# Patient Record
Sex: MALE | HISPANIC OR LATINO | Employment: OTHER | RURAL
[De-identification: names, ages, dates, MRNs, and addresses within clinical notes are randomized per-mention and may not be internally consistent; named-entity substitution may affect disease eponyms.]

---

## 2021-08-03 ENCOUNTER — TRANSCRIBE ORDERS (OUTPATIENT)
Dept: PHYSICAL THERAPY | Facility: CLINIC | Age: 61
End: 2021-08-03

## 2021-08-03 DIAGNOSIS — M25.511 RIGHT SHOULDER PAIN, UNSPECIFIED CHRONICITY: ICD-10-CM

## 2021-08-03 DIAGNOSIS — M25.50 PAIN IN JOINT, MULTIPLE SITES: Primary | ICD-10-CM

## 2021-08-10 ENCOUNTER — TREATMENT (OUTPATIENT)
Dept: PHYSICAL THERAPY | Facility: CLINIC | Age: 61
End: 2021-08-10

## 2021-08-10 DIAGNOSIS — M25.511 RIGHT SHOULDER PAIN, UNSPECIFIED CHRONICITY: Primary | ICD-10-CM

## 2021-08-10 PROCEDURE — 97110 THERAPEUTIC EXERCISES: CPT | Performed by: PHYSICAL THERAPIST

## 2021-08-10 PROCEDURE — 97162 PT EVAL MOD COMPLEX 30 MIN: CPT | Performed by: PHYSICAL THERAPIST

## 2021-08-10 NOTE — PROGRESS NOTES
"  Physical Therapy Initial Evaluation and Plan of Care      Patient: Ronda Clarke   : 1960  Diagnosis/ICD-10 Code:  Right shoulder pain, unspecified chronicity [M25.511]  Referring practitioner: Jennifer Capone NP  Date of Initial Visit: 8/10/2021  Today's Date: 8/10/2021  Patient seen for 1 sessions    Recertification: 2021   Next MD appt: 2021.    Cancer history, No estim/US         Subjective Questionnaire: QuickDASH: 45.45%      Subjective Evaluation    History of Present Illness  Onset date: ~6 months.  Mechanism of injury: Patient reports he bent over to lift something up and he felt something shift in the shoulder. No pop.       Patient Occupation: Retired from [employer]- special duty [employer] Quality of life: fair    Pain  Current pain ratin  At best pain ratin  At worst pain ratin  Location: R shoulder  Quality: pressure and sharp  Aggravating factors: sleeping, prolonged positioning, lifting and movement  Progression: no change    Hand dominance: right    Diagnostic Tests  X-ray: abnormal (\"arthritis\")    Treatments  No previous or current treatments  Patient Goals  Patient goals for therapy: decreased pain, independence with ADLs/IADLs, increased strength and increased motion             Objective          Static Posture     Head  Forward.    Shoulders  Depressed and rounded.    Scapulae  Left winging and right winging.    Postural Observations  Seated posture: poor  Standing posture: fair        Tenderness     Right Shoulder  Tenderness in the AC joint, acromion, biceps tendon (proximal) and bicipital groove.     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    Active Range of Motion   Left Shoulder   Flexion: 170 degrees   Abduction: 170 degrees   External " rotation 90°: 78 degrees   Internal rotation 90°: 68 degrees     Right Shoulder   Flexion: 122 degrees   Abduction: 82 degrees with pain  External rotation 45°: 70 degrees   Internal rotation 45°: 32 degrees with pain    Passive Range of Motion     Right Shoulder   Normal passive range of motion    Strength/Myotome Testing     Left Shoulder     Planes of Motion   Flexion: 4+   Abduction: 5   External rotation at 0°: 4+   Internal rotation at 0°: 5     Additional Strength Details  MMT for R UE deferred due ot pain in R UE.    Tests   Cervical     Left   Negative active compression (Merrick).     Right   Negative active compression (Merrick).     Left Shoulder   Negative anterior load and shift, AC shear, apprehension, belly press, clunk, crossover, drop arm, empty can, full can, Hawkin's, laxity (step off), lift-off, Neer's, painful arc, passive horizontal adduction, Speed's, SC joint stress, sulcus sign, relocation and Yergason's.     Right Shoulder   Positive crossover, Hawkin's, Neer's and sulcus sign.   Negative anterior load and shift, AC shear, apprehension, clunk, drop arm, laxity (step off), lift-off, SC joint stress and relocation.           Assessment & Plan     Assessment  Impairments: abnormal or restricted ROM, activity intolerance, impaired physical strength, lacks appropriate home exercise program and pain with function  Assessment details: Patient arrived 10 min late today. Patient has limitations in AROM and strength with s/s of impingement. Does have significant B scapular weakness noted in B shoulders. Also has significant arthritic changes in B knees and is pending B knee replacement. Patient did well with all HEP exercises today. Patient was given written copies of HEP.  Prognosis: fair  Prognosis details: Barriers to Rehab: Include significant or possible arthritic/degenerative changes that have occurred within the joint, The chronicity of this issue.    Safety Issues: Cancer history, No  estim/US    Functional Limitations: carrying objects, lifting, sleeping, pulling, pushing, uncomfortable because of pain, reaching behind back, reaching overhead and unable to perform repetitive tasks  Goals  Plan Goals: Short Term Goals:  1) I with HEP and have additions/changes by next re-certification.    2) Patient to be more aware of posture and posture correction technique.    3) AROM R Shoulder flexion >=145°.    4) AROM R Shoulder abduction >=125°.    5) AROM R shoulder IR >= 45° at 90° of shoulder abduction.    6) AROM R shoulder ER >= 75° at 90° of shoulder abduction.    7) R Shoulder  MMT >= 4/5, in available range    Long Term Goals:  1) Patient to have full AAROM with 3-way pulley's.    2) AROM R Shoulder flexion/abduction >=160°.    3) AROM R shoulder IR >= 60° at 90° of shoulder abduction.    4) AROM R shoulder ER >= 75° at 90° of shoulder abduction.    5) B UE 5/5.    6) Patient able to perform 1 arm box carry with 10# for 3 minutes with no increase in pain.    7) Patient able to perform 3 minutes of OH activities with no increase in pain.    8) I with final HEP    Plan  Therapy options: will be seen for skilled physical therapy services  Planned modality interventions: cryotherapy  Planned therapy interventions: flexibility, functional ROM exercises, home exercise program, manual therapy, neuromuscular re-education, postural training, soft tissue mobilization, joint mobilization, strengthening, stretching and therapeutic activities  Duration in visits: 12  Treatment plan discussed with: patient  Plan details: Progress overall ROM, strength, scap stab, posture, function, and activity tolerance.      Other therapeutic activities and/or exercises will be prescribed depending on the patients progress or lack there of.     Visit Diagnoses:    ICD-10-CM ICD-9-CM   1. Right shoulder pain, unspecified chronicity  M25.511 719.41       Timed:  Manual Therapy:         mins  95952;  Therapeutic Exercise:    12      mins  65032;      Neuromuscular Aditi:        mins  69619;    Therapeutic Activity:          mins  10293;     Gait Training:           mins  77750;     Ultrasound:          mins  15200;    Paraffin:        mins  74864;  Electrical Stimulation:         mins  94795 ( );    Untimed:  Electrical Stimulation:         mins  10686 ( );  Mechanical Traction:         mins  53008;     Timed Treatment:   12   mins   Total Treatment:     37   mins    PT SIGNATURE: Rita Shore PT DPT, CSCS   DATE TREATMENT INITIATED: 8/10/2021    Initial Certification  Certification Period: 11/8/2021  I certify that the therapy services are furnished while this patient is under my care.  The services outlined above are required by this patient, and will be reviewed every 90 days.     PHYSICIAN: Jennifer Capone NP      DATE:     Please sign and return via fax to  .. Thank you, [facility].        This document has been electronically signed by Rita Shore PT DPT, CSCS on August 10, 2021 08:32 CDT

## 2021-08-17 ENCOUNTER — TELEPHONE (OUTPATIENT)
Dept: PHYSICAL THERAPY | Facility: CLINIC | Age: 61
End: 2021-08-17

## 2021-08-17 NOTE — TELEPHONE ENCOUNTER
Pt initially arrived at wrong appointment time. Left and did not return for scheduled appointment time.  Called regarding missed PT appointment, no answer, busy signal.  No message left.

## 2021-08-19 ENCOUNTER — TREATMENT (OUTPATIENT)
Dept: PHYSICAL THERAPY | Facility: CLINIC | Age: 61
End: 2021-08-19

## 2021-08-19 DIAGNOSIS — M25.511 RIGHT SHOULDER PAIN, UNSPECIFIED CHRONICITY: Primary | ICD-10-CM

## 2021-08-19 PROCEDURE — 97110 THERAPEUTIC EXERCISES: CPT | Performed by: PHYSICAL THERAPIST

## 2021-08-19 NOTE — PROGRESS NOTES
Physical Therapy Daily Progress Note        Patient: Ronda Clarke   : 1960  Diagnosis/ICD-10 Code:  Right shoulder pain, unspecified chronicity [M25.511]  Referring practitioner: Jennifer Capone NP  Date of Initial Visit: Type: THERAPY  Noted: 8/10/2021  Today's Date: 2021  Patient seen for 2 sessions    Recertification: 2021   Next MD appt: 2021.     Cancer history, No estim/US           Subjective Evaluation    History of Present Illness    Subjective comment: Pt currently c/o 5/10 pain and verbalizes compliance with his HEP.       Objective   See Exercise, Manual, and Modality Logs for complete treatment.       Assessment & Plan     Assessment  Assessment details: Pt required several cues to improve posture and for correct technique with ex's especially due to his tendency to be impulsive.    Goals  Plan Goals: Plan Goals: Short Term Goals:  1) I with HEP and have additions/changes by next re-certification.    2) Patient to be more aware of posture and posture correction technique.    3) AROM R Shoulder flexion >=145°.    4) AROM R Shoulder abduction >=125°.    5) AROM R shoulder IR >= 45° at 90° of shoulder abduction.    6) AROM R shoulder ER >= 75° at 90° of shoulder abduction.    7) R Shoulder  MMT >= 4/5, in available range    Long Term Goals:  1) Patient to have full AAROM with 3-way pulley's.    2) AROM R Shoulder flexion/abduction >=160°.    3) AROM R shoulder IR >= 60° at 90° of shoulder abduction.    4) AROM R shoulder ER >= 75° at 90° of shoulder abduction.    5) B UE 5/5.    6) Patient able to perform 1 arm box carry with 10# for 3 minutes with no increase in pain.    7) Patient able to perform 3 minutes of OH activities with no increase in pain.    8) I with final HEP    Plan  Plan details: May add wall wipes and/or circles next visit.                   Manual Therapy:         mins  09329;  Therapeutic Exercise:    48     mins  40630;     Neuromuscular Aditi:        mins   16619;    Therapeutic Activity:          mins  95806;     Gait Training:           mins  54165;     Ultrasound:          mins  94229;    Electrical Stimulation:         mins  77213 ( );  Dry Needling          mins self-pay    Timed Treatment:   48   mins   Total Treatment:     48   mins    Malina Flor PTA  Physical Therapist Assistant

## 2021-08-24 ENCOUNTER — TREATMENT (OUTPATIENT)
Dept: PHYSICAL THERAPY | Facility: CLINIC | Age: 61
End: 2021-08-24

## 2021-08-24 DIAGNOSIS — M25.511 RIGHT SHOULDER PAIN, UNSPECIFIED CHRONICITY: Primary | ICD-10-CM

## 2021-08-24 PROCEDURE — 97110 THERAPEUTIC EXERCISES: CPT | Performed by: PHYSICAL THERAPIST

## 2021-08-24 NOTE — PROGRESS NOTES
"   Physical Therapy Daily Treatment Note      Patient: Ronda Clarke   : 1960  Referring practitioner: Jennifer Capone NP  Date of Initial Visit: Type: THERAPY  Noted: 8/10/2021  Today's Date: 2021  Patient seen for 3 sessions    Recertification: 2021   Next MD appt: 2021.     Cancer history, No estim/US       Ronda Clarke reports:         Subjective Evaluation    History of Present Illness    Subjective comment: pt states that his shoulder is sore. No pain, shoulder might be a little better.Pain  Pain scale: \"sore\"           Objective          Active Range of Motion     Right Shoulder   Flexion: Right shoulder active forward flexion: supine AA wand flexion 155.       See Exercise, Manual, and Modality Logs for complete treatment.       Assessment & Plan     Assessment  Assessment details: Pt requires mod/max cues for form with treatment. Pt did fair with standing AA activities. Required increase cues for upright posture    Goals  Plan Goals: Short Term Goals:  1) I with HEP and have additions/changes by next re-certification.    2) Patient to be more aware of posture and posture correction technique.    3) AROM R Shoulder flexion >=145°.    4) AROM R Shoulder abduction >=125°.    5) AROM R shoulder IR >= 45° at 90° of shoulder abduction.    6) AROM R shoulder ER >= 75° at 90° of shoulder abduction.    7) R Shoulder  MMT >= 4/5, in available range    Long Term Goals:  1) Patient to have full AAROM with 3-way pulley's.    2) AROM R Shoulder flexion/abduction >=160°.    3) AROM R shoulder IR >= 60° at 90° of shoulder abduction.    4) AROM R shoulder ER >= 75° at 90° of shoulder abduction.    5) B UE 5/5.    6) Patient able to perform 1 arm box carry with 10# for 3 minutes with no increase in pain.    7) Patient able to perform 3 minutes of OH activities with no increase in pain.    8) I with final HEP      Plan  Plan details: Try standing Tband shoulder IR/ER gentle next visit. Try prone " ITYs.        Visit Diagnoses:    ICD-10-CM ICD-9-CM   1. Right shoulder pain, unspecified chronicity  M25.511 719.41       Progress per Plan of Care and Progress strengthening /stabilization /functional activity           Timed:  Manual Therapy:         mins  51682;  Therapeutic Exercise:    45     mins  23829;     Neuromuscular Aditi:        mins  13902;    Therapeutic Activity:          mins  64854;     Gait Training:           mins  96265;     Ultrasound:          mins  54184;    Electrical Stimulation:         mins  06224 ( );    Untimed:  Electrical Stimulation:         mins  15597 ( );  Mechanical Traction:         mins  61535;     Timed Treatment:   45   mins   Total Treatment:     45   mins  Marcelle Reed [facility]  Physical Therapist

## 2021-08-26 ENCOUNTER — TREATMENT (OUTPATIENT)
Dept: PHYSICAL THERAPY | Facility: CLINIC | Age: 61
End: 2021-08-26

## 2021-08-26 DIAGNOSIS — M25.511 RIGHT SHOULDER PAIN, UNSPECIFIED CHRONICITY: Primary | ICD-10-CM

## 2021-08-26 PROCEDURE — 97110 THERAPEUTIC EXERCISES: CPT | Performed by: PHYSICAL THERAPIST

## 2021-08-26 NOTE — PROGRESS NOTES
Physical Therapy Daily Progress Note    Patient: Ronda Clarke   : 1960  Diagnosis/ICD-10 Code:     Diagnosis Plan   1. Right shoulder pain, unspecified chronicity       Referring practitioner: Jennifer Capone NP  Date of Initial Visit: Type: THERAPY  Noted: 8/10/2021  Today's Date: 2021  Patient seen for 4 sessions      PT Recheck Due: 2021  PT MD Visit: 2021    Cancer history, No estim/US       Ronda Clarke reports: 60% improvement        Subjective Evaluation    History of Present Illness    Subjective comment: Pt states his shoulder is doing better.  He is happy that after 6 months of hurting him          Objective   See Exercise, Manual, and Modality Logs for complete treatment.       Assessment & Plan     Assessment  Assessment details: Pt with good effort with all therex.  Occasional cues for slowing movements for improved quality.  Verbal cues for posture throughout tx session.  Reports occasional popping in R shoulder with aching with mid rows.  No increased pain reports with addition of wall wipes.      Goals  Plan Goals: Short Term Goals:  1) I with HEP and have additions/changes by next re-certification.    2) Patient to be more aware of posture and posture correction technique.    3) AROM R Shoulder flexion >=145°.    4) AROM R Shoulder abduction >=125°.    5) AROM R shoulder IR >= 45° at 90° of shoulder abduction.    6) AROM R shoulder ER >= 75° at 90° of shoulder abduction.    7) R Shoulder  MMT >= 4/5, in available range    Long Term Goals:  1) Patient to have full AAROM with 3-way pulley's.    2) AROM R Shoulder flexion/abduction >=160°.    3) AROM R shoulder IR >= 60° at 90° of shoulder abduction.    4) AROM R shoulder ER >= 75° at 90° of shoulder abduction.    5) B UE 5/5.    6) Patient able to perform 1 arm box carry with 10# for 3 minutes with no increase in pain.    7) Patient able to perform 3 minutes of OH activities with no increase in pain.    8) I with final  HEP    Plan  Plan details: Next visit add ITY's either prone or over pball as tolerated.       Progress per Plan of Care and Progress strengthening /stabilization /functional activity            Timed:  Manual Therapy:         mins  38685;  Therapeutic Exercise:    40     mins  71879;   Aquatic Therex :        mins  41588    Neuromuscular Aditi:        mins  03099;    Therapeutic Activity:          mins  51489;     Gait Training:           mins  06811;     Ultrasound:          mins  25009;    Electrical Stimulation:         mins  38093 ( );    Untimed:  Electrical Stimulation:         mins  10760 ( );  Mechanical Traction:         mins  93452;   Orthotic fit/train:         mins  26699    Timed Treatment:   40   mins   Total Treatment:     40   mins  Kadie Dhillon PTA  Physical Therapist Assistant        This document has been electronically signed by Kadie Dhillon PTA on August 26, 2021 09:30 CDT

## 2021-08-30 ENCOUNTER — TREATMENT (OUTPATIENT)
Dept: PHYSICAL THERAPY | Facility: CLINIC | Age: 61
End: 2021-08-30

## 2021-08-30 DIAGNOSIS — M25.511 RIGHT SHOULDER PAIN, UNSPECIFIED CHRONICITY: Primary | ICD-10-CM

## 2021-08-30 PROCEDURE — 97530 THERAPEUTIC ACTIVITIES: CPT | Performed by: PHYSICAL THERAPIST

## 2021-08-30 PROCEDURE — 97110 THERAPEUTIC EXERCISES: CPT | Performed by: PHYSICAL THERAPIST

## 2021-08-30 NOTE — PROGRESS NOTES
Re-Assessment / Re-Certification      Patient: Ronda Clarke   : 1960  Diagnosis/ICD-10 Code:  Right shoulder pain, unspecified chronicity [M25.511]  Referring practitioner: Jennifer Capone NP  Date of Initial Visit: Type: THERAPY  Noted: 8/10/2021  Today's Date: 2021  Patient seen for 5 sessions    Recertification: 2021   Next MD appt: 2021.    Cancer history, No estim/US    Subjective:   Ronda Clarke reports: 60% improvement.  Subjective Questionnaire: QuickDASH: 15%  Clinical Progress: improved  Home Program Compliance: Yes  Treatment has included: therapeutic exercise, neuromuscular re-education and therapeutic activity    Subjective Evaluation    History of Present Illness    Subjective comment: Patient reports moderate pain today. he reports that his pain is mainly at night. patient encouraged to ise prior to bed.Pain  Current pain rating: 3         Objective          Static Posture     Head  Forward.    Shoulders  Depressed and rounded.    Scapulae  Left winging and right winging.    Postural Observations  Seated posture: fair  Standing posture: fair        Tenderness     Right Shoulder  Tenderness in the AC joint and acromion. No tenderness in the biceps tendon (proximal) and bicipital groove.     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    Active Range of Motion   Left Shoulder   Flexion: 170 degrees   Abduction: 170 degrees   External rotation 90°: 78 degrees   Internal rotation 90°: 68 degrees     Right Shoulder   Flexion: 155 degrees   Abduction: 150 degrees   External rotation 90°: 80 degrees  Internal rotation 90°: 40 degrees     Passive Range of Motion     Right Shoulder   Normal passive range of motion    Strength/Myotome Testing     Left Shoulder     Planes of  Motion   Flexion: 4+   Abduction: 5   External rotation at 0°: 5   Internal rotation at 0°: 5     Right Shoulder     Planes of Motion   Flexion: 4+   Abduction: 4+   External rotation at 0°: 4+   Internal rotation at 0°: 5     Additional Strength Details  MMT for R UE deferred due ot pain in R UE.    Tests   Cervical     Left   Negative active compression (Snyder).     Right   Negative active compression (Snyder).     Left Shoulder   Negative anterior load and shift, AC shear, apprehension, belly press, clunk, crossover, drop arm, empty can, full can, Hawkin's, laxity (step off), lift-off, Neer's, painful arc, passive horizontal adduction, Speed's, SC joint stress, sulcus sign, relocation and Yergason's.     Right Shoulder   Positive Hawkin's, Neer's and sulcus sign.   Negative anterior load and shift, AC shear, apprehension, clunk, crossover, drop arm, laxity (step off), lift-off, SC joint stress and relocation.       Assessment & Plan     Assessment  Impairments: abnormal or restricted ROM, activity intolerance, impaired physical strength, lacks appropriate home exercise program and pain with function  Assessment details: Patient has significant improvement in ROM and strength. Still some decrease in OH activity tolerance and scapular weakness persists. Patient was issued BTB for HEP for rows and ext. Patient requested ice post treatment.  Prognosis: fair  Prognosis details: Barriers to Rehab: Include significant or possible arthritic/degenerative changes that have occurred within the joint, The chronicity of this issue.    Safety Issues: Cancer history, No estim/US    Functional Limitations: carrying objects, lifting, sleeping, pulling, pushing, uncomfortable because of pain, reaching behind back, reaching overhead and unable to perform repetitive tasks  Goals  Plan Goals: Short Term Goals:  1) I with HEP and have additions/changes by next re-certification. (met)    2) Patient to be more aware of posture and  posture correction technique. (partially met/ongoing)    3) AROM R Shoulder flexion >=145°. (met)    4) AROM R Shoulder abduction >=125°. (met)    5) AROM R shoulder IR >= 45° at 90° of shoulder abduction. (ongoing)    6) AROM R shoulder ER >= 75° at 90° of shoulder abduction. (met)    7) R Shoulder  MMT >= 4/5, in available range (met)    Long Term Goals:  1) Patient to have full AAROM with 3-way pulley's. (met)    2) AROM R Shoulder flexion/abduction >=160°.    3) AROM R shoulder IR >= 60° at 90° of shoulder abduction.    4) AROM R shoulder ER >= 75° at 90° of shoulder abduction. (met)    5) B UE 5/5.    6) Patient able to perform 1 arm box carry with 10# for 3 minutes with no increase in pain.    7) Patient able to perform 3 minutes of OH activities with no increase in pain.    8) I with final HEP    Plan  Therapy options: will be seen for skilled physical therapy services  Planned modality interventions: cryotherapy  Planned therapy interventions: flexibility, functional ROM exercises, home exercise program, manual therapy, neuromuscular re-education, postural training, soft tissue mobilization, joint mobilization, strengthening, stretching and therapeutic activities  Duration in visits: 6  Treatment plan discussed with: patient  Plan details: Progress strength as primary focus at this point in time.      Other therapeutic activities and/or exercises will be prescribed depending on the patients progress or lack there of.     Visit Diagnoses:    ICD-10-CM ICD-9-CM   1. Right shoulder pain, unspecified chronicity  M25.511 719.41       Progress toward previous goals: Partially Met        Recommendations: Continue with recommendations of concentrating on strength  Timeframe: 3 weeks - 4 WEEKS  Prognosis to achieve goals: fair    PT Signature: Rita Shore, PT DPT, CSCS      Based upon review of the patient's progress and continued therapy plan, it is my medical opinion that Ronda Clarke should continue  physical therapy treatment at [facility]  [address] [place] [address]   [place] [address] [phone].    Signature: __________________________________  Jennifer Capone NP    Timed:  Manual Therapy:         mins  99714;  Therapeutic Exercise:    35     mins  25698;     Neuromuscular Aditi:        mins  29764;    Therapeutic Activity:     10     mins  47643;     Gait Training:           mins  36567;     Ultrasound:          mins  09410;    Paraffin:        mins  66324;  Electrical Stimulation:         mins  66798 ( );    Untimed:  Electrical Stimulation:         mins  96342 ( );  Mechanical Traction:         mins  47771;     Timed Treatment:   45   mins   Total Treatment:     55   mins            This document has been electronically signed by Rita Shore, PT DPT, CSCS on August 30, 2021 08:29 CDT

## 2021-09-01 ENCOUNTER — HOSPITAL ENCOUNTER (OUTPATIENT)
Dept: PHYSICAL THERAPY | Facility: HOSPITAL | Age: 61
Setting detail: THERAPIES SERIES
Discharge: HOME | End: 2021-09-01

## 2021-09-01 DIAGNOSIS — M25.511 RIGHT SHOULDER PAIN, UNSPECIFIED CHRONICITY: Primary | ICD-10-CM

## 2021-09-01 PROCEDURE — 97110 THERAPEUTIC EXERCISES: CPT

## 2021-09-01 NOTE — THERAPY TREATMENT NOTE
Outpatient Physical Therapy Ortho Treatment Note  [facility]     Patient Name: Ronda Clarke  : 1960  MRN: [id number]  Today's Date: 2021      Visit Date: 2021     This is a transfer from [facility] to [facility].    Attendance:  (15 visits authorized)  60 maybe 70% Improvement  MD Visit: 2021  Recheck Date: 2021    Therapy Diagnosis: R Shoulder Pain    Visit Dx:    ICD-10-CM ICD-9-CM   1. Right shoulder pain, unspecified chronicity  M25.511 719.41            Past Medical History:   Diagnosis Date   • Allergic    • Anxiety    • Arthritis    • Cancer (CMS/HCC) 2020    Stomach cancer; surgery and had chemo   • Depression    • High blood pressure    • Injury of neck         No past surgical history on file.    PT Ortho     Row Name 21 0800       Subjective Comments    Subjective Comments  states that not only is his shoulder hurting but also his knees and back as well.   -       Subjective Pain    Able to rate subjective pain?  yes  -    Pre-Treatment Pain Level  4  -    Post-Treatment Pain Level  -- not rated this date  -      User Key  (r) = Recorded By, (t) = Taken By, (c) = Cosigned By    Initials Name Provider Type     Litzy Durand, PTA Physical Therapy Assistant                      PT Assessment/Plan     Row Name 21 09          PT Assessment    Assessment Comments  patient requires multiple verbal cueing for postural correction. patient does need cueing for therex this treatment to keep appropriate form throughout as well. gives good effort. discussed open capusle position when performing ROM exercises today as well as the importance of core stability while performing all therex. patient receptive to postural cueing.   -        PT Plan    PT Frequency  2x/week  -     PT Plan Comments  continue with strengthening exercises, next visit add 1H box carry   -       User Key  (r) = Recorded By, (t) = Taken By, (c) = Cosigned By    Initials Name  Provider Type     Litzy Durand PTA Physical Therapy Assistant          add   OP Exercises     Row Name 09/01/21 0800             Subjective Comments    Subjective Comments  states that not only is his shoulder hurting but also his knees and back as well.   -         Subjective Pain    Able to rate subjective pain?  yes  -      Pre-Treatment Pain Level  4  -      Post-Treatment Pain Level  -- not rated this date  -         Exercise 1    Exercise Name 1  Pro II UE Fwd/Retro for ROM, Posturing, Strength, Endurance  -      Time 1  10 minutes  -      Additional Comments  L 7.1  -         Exercise 2    Exercise Name 2  Pulley's 3 way  -      Time 2  3 minutes each  -         Exercise 3    Exercise Name 3  Tband Rows Mid/Low  -      Cueing 3  Verbal;Demo  -      Reps 3  20 each  -      Additional Comments  Blue Tband  -         Exercise 4    Exercise Name 4  Tband B Shld Extension  -      Cueing 4  Verbal;Demo  -      Reps 4  20  -MH      Additional Comments  Blue Tband  -         Exercise 5    Exercise Name 5  Tband Clocks  -      Cueing 5  Verbal;Demo  -      Reps 5  20 alternating through  -      Additional Comments  Blue Tband  -         Exercise 6    Exercise Name 6  Wall Push Ups  -      Reps 6  20  -MH         Exercise 7    Exercise Name 7  Full Can's 3 way  -      Sets 7  2  -      Reps 7  10  -      Time 7  5 sec hold  -      Additional Comments  2# weight  -        User Key  (r) = Recorded By, (t) = Taken By, (c) = Cosigned By    Initials Name Provider Type     Litzy Durand PTA Physical Therapy Assistant                       PT OP Goals     Row Name 09/01/21 0900          PT Short Term Goals    STG 1   I with HEP and have additions/changes by next re-certification.  -     STG 1 Progress  Met  -     STG 2   I with HEP and have additions/changes by next re-certification.  -     STG 2 Progress  Partially Met  -     STG 3  AROM R Shoulder flexion  >=145°  -     STG 3 Progress  Met  -     STG 4  AROM R Shoulder abduction >=125°.  -     STG 4 Progress  Met  -     STG 5  AROM R shoulder IR >= 45° at 90° of shoulder abduction.  -     STG 5 Progress  Ongoing  -     STG 6  AROM R shoulder ER >= 75° at 90° of shoulder abduction  -     STG 6 Progress  Met  -     STG 7  R Shoulder  MMT >= 4/5, in available range  -     STG 7 Progress  Met  -        Long Term Goals    LTG 1   Patient to have full AAROM with 3-way pulley's  -     LTG 1 Progress  Met  -     LTG 2  AROM R Shoulder flexion/abduction >=160°.  -     LTG 2 Progress  Ongoing  -     LTG 3   AROM R shoulder IR >= 60° at 90° of shoulder abduction.  -     LTG 3 Progress  Ongoing  -     LTG 4  AROM R shoulder ER >= 75° at 90° of shoulder abduction.   -     LTG 4 Progress  Met  -     LTG 5   B UE 5/5  -     LTG 5 Progress  Ongoing  -     LTG 6  Patient able to perform 1 arm box carry with 10# for 3 minutes with no increase in pain.  -     LTG 6 Progress  Ongoing  -     LTG 7  Patient able to perform 3 minutes of OH activities with no increase in pain.  -     LTG 7 Progress  Ongoing  -     LTG 8   I with final HEP  -     LTG 8 Progress  Ongoing  -       User Key  (r) = Recorded By, (t) = Taken By, (c) = Cosigned By    Initials Name Provider Type    Litzy Perez PTA Physical Therapy Assistant          Therapy Education  Program: Reinforced  Provided to: Patient  Level of Understanding: Verbalized              Time Calculation:   Start Time: 0839  Stop Time: 0927  Time Calculation (min): 48 min  Total Timed Code Minutes- PT: 48 minute(s)  Therapy Charges for Today     Code Description Service Date Service Provider Modifiers Qty    26881535075 HC PT THER PROC EA 15 MIN 9/1/2021 Litzy Durand PTA GP 3    27542870951 HC PT THER SUPP EA 15 MIN 9/1/2021 Litzy Durand PTA GP 1                    Litzy Durand PTA  9/1/2021         This document has been  electronically signed by Litzy Durand PTA on September 1, 2021 13:39 CDT

## 2021-09-08 ENCOUNTER — APPOINTMENT (OUTPATIENT)
Dept: PHYSICAL THERAPY | Facility: HOSPITAL | Age: 61
End: 2021-09-08

## 2021-09-10 ENCOUNTER — HOSPITAL ENCOUNTER (OUTPATIENT)
Dept: PHYSICAL THERAPY | Facility: HOSPITAL | Age: 61
Setting detail: THERAPIES SERIES
Discharge: HOME | End: 2021-09-10

## 2021-09-10 DIAGNOSIS — M25.511 RIGHT SHOULDER PAIN, UNSPECIFIED CHRONICITY: Primary | ICD-10-CM

## 2021-09-10 PROCEDURE — 97110 THERAPEUTIC EXERCISES: CPT

## 2021-09-10 NOTE — THERAPY TREATMENT NOTE
Outpatient Physical Therapy Ortho Treatment Note  [facility]     Patient Name: Ronda Clarke  : 1960  MRN: [id number]  Today's Date: 9/10/2021      Visit Date: 09/10/2021     Attendance: 7/10 (15 visits authorized)  60 maybe 70% Improvement  MD Visit: 2021  Recheck Date: 2021     Therapy Diagnosis: R Shoulder Pain    Visit Dx:    ICD-10-CM ICD-9-CM   1. Right shoulder pain, unspecified chronicity  M25.511 719.41       There is no problem list on file for this patient.       Past Medical History:   Diagnosis Date   • Allergic    • Anxiety    • Arthritis    • Cancer (CMS/[facility]) 2020    Stomach cancer; surgery and had chemo   • Depression    • High blood pressure    • Injury of neck         No past surgical history on file.    PT Ortho     Row Name 09/10/21 0800       Subjective Comments    Subjective Comments  states that his shoulder only hurts in certain positions such as abduction to 90°   -       Subjective Pain    Able to rate subjective pain?  yes  -    Pre-Treatment Pain Level  3  -      User Key  (r) = Recorded By, (t) = Taken By, (c) = Cosigned By    Initials Name Provider Type    Litzy Perez PTA Physical Therapy Assistant                      PT Assessment/Plan     Row Name 09/10/21 0800          PT Assessment    Assessment Comments  patient has no difficulty with 1H box carry regarding his UE. gives good effort throughout treatment. does need postural cueing throughout treatment.   -        PT Plan    PT Frequency  2x/week  -     PT Plan Comments  next visit overhead drawing/writing  -       User Key  (r) = Recorded By, (t) = Taken By, (c) = Cosigned By    Initials Name Provider Type    Litzy Perez PTA Physical Therapy Assistant            OP Exercises     Row Name 09/10/21 0800             Subjective Comments    Subjective Comments  states that his shoulder only hurts in certain positions such as abduction to 90°   -         Subjective Pain    Able to  rate subjective pain?  yes  -      Pre-Treatment Pain Level  3  -         Exercise 1    Exercise Name 1  Pro II UE Fwd/Retro for ROM, Posturing, Strength, Endurance  -      Time 1  10 minutes  -      Additional Comments  L 7.2  -         Exercise 2    Exercise Name 2  Pulley's 3 way  -      Time 2  3 minutes each  -      Additional Comments  Ecc Lower  -         Exercise 3    Exercise Name 3  Tband Rows Mid/Low  -      Reps 3  20 each  -      Additional Comments  Blue Tband  -         Exercise 4    Exercise Name 4  Tband B Shld Extension  -      Reps 4  20  -MH      Additional Comments  Blue Tband   -         Exercise 5    Exercise Name 5  Tband IR/ER Step Outs  -      Reps 5  20  -MH      Additional Comments  Green Tband  -         Exercise 6    Exercise Name 6  Full Can's 3 way  -      Sets 6  2  -MH      Reps 6  10  -      Time 6  5 sec hold  -      Additional Comments  2# weight   -         Exercise 7    Exercise Name 7  1H Box Carry  -      Time 7  3 minutes  -      Additional Comments  Box + 5# (approximately 12# total)  -        User Key  (r) = Recorded By, (t) = Taken By, (c) = Cosigned By    Initials Name Provider Type    Litzy Perez, PTA Physical Therapy Assistant                       PT OP Goals     Row Name 09/10/21 0800          PT Short Term Goals    STG 1   I with HEP and have additions/changes by next re-certification.  -     STG 1 Progress  Met  -     STG 2  Patient to be more aware of posture and posture correction technique.  -     STG 2 Progress  Partially Met  -     STG 3  AROM R Shoulder flexion >=145°  -     STG 3 Progress  Met  -     STG 4  AROM R Shoulder abduction >=125°.  -     STG 4 Progress  Met  -     STG 5  AROM R shoulder IR >= 45° at 90° of shoulder abduction.  -     STG 5 Progress  Ongoing  -     STG 6  AROM R shoulder ER >= 75° at 90° of shoulder abduction  -     STG 6 Progress  Met  -     STG 7  R Shoulder   MMT >= 4/5, in available range  -     STG 7 Progress  Met  [facility]        Long Term Goals    LTG 1   Patient to have full AAROM with 3-way pulley's  -     LTG 1 Progress  Met  -     LTG 2  AROM R Shoulder flexion/abduction >=160°.  -     LTG 2 Progress  Ongoing  -     LTG 3   AROM R shoulder IR >= 60° at 90° of shoulder abduction.  -     LTG 3 Progress  Ongoing  -     LTG 4  AROM R shoulder ER >= 75° at 90° of shoulder abduction.   -     LTG 4 Progress  Met  -     LTG 5   B UE 5/5  -     LTG 5 Progress  Ongoing  -     LTG 6  Patient able to perform 1 arm box carry with 10# for 3 minutes with no increase in pain.  -     LTG 6 Progress  Met  -     LTG 7  Patient able to perform 3 minutes of OH activities with no increase in pain.  -     LTG 7 Progress  Ongoing  -     LTG 8   I with final HEP  -     LTG 8 Progress  Ongoing  -        Time Calculation    PT Goal Re-Cert Due Date  09/21/21  -       User Key  (r) = Recorded By, (t) = Taken By, (c) = Cosigned By    Initials Name Provider Type     Litzy Durand PTA Physical Therapy Assistant          Therapy Education  Given: HEP, Symptoms/condition management, Pain management, Posture/body mechanics  Program: Reinforced  How Provided: Verbal  Provided to: Patient  Level of Understanding: Verbalized              Time Calculation:   Start Time: 0831  Stop Time: 0917  Time Calculation (min): 46 min  Total Timed Code Minutes- PT: 46 minute(s)  Therapy Charges for Today     Code Description Service Date Service Provider Modifiers Qty    90104012307 HC PT THER PROC EA 15 MIN 9/10/2021 Litzy Durand PTA GP 3    71784602741 HC PT THER SUPP EA 15 MIN 9/10/2021 Litzy Durand PTA GP 1                    Litzy Durand PTA  9/10/2021       This document has been electronically signed by Litzy Durand PTA on September 10, 2021 09:40 CDT

## 2021-09-13 ENCOUNTER — APPOINTMENT (OUTPATIENT)
Dept: PHYSICAL THERAPY | Facility: HOSPITAL | Age: 61
End: 2021-09-13

## 2021-09-14 ENCOUNTER — HOSPITAL ENCOUNTER (OUTPATIENT)
Dept: PHYSICAL THERAPY | Facility: HOSPITAL | Age: 61
Setting detail: THERAPIES SERIES
Discharge: HOME | End: 2021-09-14

## 2021-09-14 DIAGNOSIS — M25.511 RIGHT SHOULDER PAIN, UNSPECIFIED CHRONICITY: Primary | ICD-10-CM

## 2021-09-14 PROCEDURE — 97110 THERAPEUTIC EXERCISES: CPT

## 2021-09-14 NOTE — THERAPY TREATMENT NOTE
"    Outpatient Physical Therapy Ortho Treatment Note  [facility]     Patient Name: Ronda Clarke  : 1960  MRN: [id number]  Today's Date: 2021      Visit Date: 2021     Attendance:   \"Feels like improvement has stalled\"% Improvement  MD Visit: 2021  Recheck Date: 2021    Therapy Diagnosis: Right Shoulder Pain          Visit Dx:    ICD-10-CM ICD-9-CM   1. Right shoulder pain, unspecified chronicity  M25.511 719.41       There is no problem list on file for this patient.       Past Medical History:   Diagnosis Date   • Allergic    • Anxiety    • Arthritis    • Cancer (CMS/[facility]) 2020    Stomach cancer; surgery and had chemo   • Depression    • High blood pressure    • Injury of neck         No past surgical history on file.    PT Ortho     Row Name 21 1000       Subjective Comments    Subjective Comments  states that he felt like he made progress initially but now states that he feels like hes not improving anymore. states that his shoulder is painful.   -MH       Precautions and Contraindications    Precautions/Limitations  (S) other (see comments) Cancer History no ESTIM or US  -MH    Contraindications  (S) Cancer History no ESTIM or US  -MH       Subjective Pain    Able to rate subjective pain?  yes  -MH    Pre-Treatment Pain Level  6  -MH    Post-Treatment Pain Level  2  -MH       Right Upper Ext    Rt Shoulder Abduction AROM  152  -MH    Rt Shoulder Flexion AROM  150  -MH    Rt Shoulder External Rotation AROM  80° @ 90° shoulder ABD  -MH    Rt Shoulder Internal Rotation AROM  34° @ 90° shoulder ABD  -MH       Left Lower Ext    Lt Hip ABduction AROM  152  -MH    Lt Hip Flexion AROM  154  -MH    Lt Hip External Rotation AROM  80° @ 90° shoulder ABD  -MH    Lt Hip Internal Rotation AROM  66° @ 90° shoulder ABD  -MH      User Key  (r) = Recorded By, (t) = Taken By, (c) = Cosigned By    Initials Name Provider Type    Litzy Perez PTA Physical Therapy Assistant    "                   PT Assessment/Plan     Row Name 09/14/21 1000          PT Assessment    Assessment Comments  patient continues limited with AROM of right shoulder IR. is symmetrical with all other motions. patient has no difficulty with OH writing and no difficulty with single hand box carry.   -        PT Plan    PT Frequency  2x/week  -     PT Plan Comments  next visit continue IR stretch with strap  -MH       User Key  (r) = Recorded By, (t) = Taken By, (c) = Cosigned By    Initials Name Provider Type    Litzy Perez, PTA Physical Therapy Assistant            OP Exercises     Row Name 09/14/21 1000             Precautions    Existing Precautions/Restrictions  (S) other (see comments) Cancer History no ESTIM or US  -         Subjective Comments    Subjective Comments  states that he felt like he made progress initially but now states that he feels like hes not improving anymore. states that his shoulder is painful.   -         Subjective Pain    Able to rate subjective pain?  yes  -      Pre-Treatment Pain Level  6  -      Post-Treatment Pain Level  2  -         Exercise 1    Exercise Name 1  Pro II UE Fwd/Retro for ROM, Posturing, Strength, Endurance  -      Time 1  10 minutes  -      Additional Comments  L 6.0    -         Exercise 2    Exercise Name 2  OH Writing/Drawing  -      Time 2  3 minutes  -         Exercise 3    Exercise Name 3  1H Box Carry  -      Time 3  3 minutes  -      Additional Comments  Box + 5# (approximately 12# total)  -         Exercise 4    Exercise Name 4  IR S with Strap  -      Reps 4  3  -      Time 4  1 minute  -         Exercise 5    Exercise Name 5  IR S with strap  -MH      Reps 5  3  -      Time 5  1 minute  -         Exercise 6    Exercise Name 6  Tband Rows Mid/Low  -      Reps 6  20 each  -      Additional Comments  Blue  -         Exercise 7    Exercise Name 7  Tband B Shoulder Extension  -      Reps 7  20  -MH       Additional Comments  Blue  -         Exercise 8    Exercise Name 8  Tband IR/ER Step outs  -      Reps 8  20 each  -      Additional Comments  Green  -         Exercise 9    Exercise Name 9  Tband Clocks 3 way  -      Reps 9  20 each  -      Additional Comments  Green  -        User Key  (r) = Recorded By, (t) = Taken By, (c) = Cosigned By    Initials Name Provider Type    Litzy Perez PTA Physical Therapy Assistant                       PT OP Goals     Row Name 09/14/21 1000          PT Short Term Goals    STG 1   I with HEP and have additions/changes by next re-certification.  -     STG 1 Progress  Met  -     STG 2  Patient to be more aware of posture and posture correction technique.  -     STG 2 Progress  Partially Met  -     STG 3  AROM R Shoulder flexion >=145°  -     STG 3 Progress  Met  -     STG 4  AROM R Shoulder abduction >=125°.  -     STG 4 Progress  Met  -     STG 5  AROM R shoulder IR >= 45° at 90° of shoulder abduction.  -     STG 5 Progress  Ongoing  -     STG 6  AROM R shoulder ER >= 75° at 90° of shoulder abduction  -     STG 6 Progress  Met  [facility]     STG 7  R Shoulder  MMT >= 4/5, in available range  -     STG 7 Progress  Met  -        Long Term Goals    LTG 1   Patient to have full AAROM with 3-way pulley's  -     LTG 1 Progress  Met  [facility]     LTG 2  AROM R Shoulder flexion/abduction >=160°.  -     LTG 2 Progress  Ongoing  -     LTG 3   AROM R shoulder IR >= 60° at 90° of shoulder abduction.  -     LTG 3 Progress  Ongoing  -     LTG 4  AROM R shoulder ER >= 75° at 90° of shoulder abduction.   -     LTG 4 Progress  Met  -     LTG 5   B UE 5/5  -     LTG 5 Progress  Ongoing  -     LTG 6  Patient able to perform 1 arm box carry with 10# for 3 minutes with no increase in pain.  -     LTG 6 Progress  Met  [facility]     LTG 7  Patient able to perform 3 minutes of OH activities with no increase in pain.  -     LTG 7 Progress  Met  [facility]     LTG  8   I with final HEP  -MH     LTG 8 Progress  Ongoing  -        Time Calculation    PT Goal Re-Cert Due Date  09/21/21  -       User Key  (r) = Recorded By, (t) = Taken By, (c) = Cosigned By    Initials Name Provider Type    Litzy Perez PTA Physical Therapy Assistant          Therapy Education  Given: HEP, Symptoms/condition management, Pain management, Posture/body mechanics              Time Calculation:   Start Time: 1000  Stop Time: 1045  Time Calculation (min): 45 min  Total Timed Code Minutes- PT: 45 minute(s)  Therapy Charges for Today     Code Description Service Date Service Provider Modifiers Qty    51885940356 HC PT THER PROC EA 15 MIN 9/14/2021 Litzy Durand PTA GP 3    05063500255 HC PT THER SUPP EA 15 MIN 9/14/2021 Litzy Durand PTA GP 1                    Litzy Durand PTA  9/14/2021       This document has been electronically signed by Litzy Durand PTA on September 14, 2021 11:07 CDT

## 2021-09-15 ENCOUNTER — APPOINTMENT (OUTPATIENT)
Dept: PHYSICAL THERAPY | Facility: HOSPITAL | Age: 61
End: 2021-09-15

## 2021-09-16 ENCOUNTER — APPOINTMENT (OUTPATIENT)
Dept: PHYSICAL THERAPY | Facility: HOSPITAL | Age: 61
End: 2021-09-16

## 2021-09-21 ENCOUNTER — HOSPITAL ENCOUNTER (OUTPATIENT)
Dept: PHYSICAL THERAPY | Facility: HOSPITAL | Age: 61
Setting detail: THERAPIES SERIES
Discharge: HOME | End: 2021-09-21

## 2021-09-21 DIAGNOSIS — M25.511 RIGHT SHOULDER PAIN, UNSPECIFIED CHRONICITY: Primary | ICD-10-CM

## 2021-09-21 PROCEDURE — 97530 THERAPEUTIC ACTIVITIES: CPT | Performed by: PHYSICAL THERAPIST

## 2021-09-21 PROCEDURE — 97110 THERAPEUTIC EXERCISES: CPT | Performed by: PHYSICAL THERAPIST

## 2021-09-21 NOTE — THERAPY DISCHARGE NOTE
Outpatient Physical Therapy Ortho Progress Note/Discharge Summary  [facility]     Patient Name: Ronda Clarke  : 1960  MRN: [id number]  Today's Date: 2021      Visit Date: 2021     Attendance:   80% Improvement  Next MD appt: 2021.  Recertification: N/A    Therapy Diagnosis: R shoulder impingement        Visit Dx:    ICD-10-CM ICD-9-CM   1. Right shoulder pain, unspecified chronicity  M25.511 719.41          Past Medical History:   Diagnosis Date   • Allergic    • Anxiety    • Arthritis    • Cancer (CMS/[facility]) 2020    Stomach cancer; surgery and had chemo   • Depression    • High blood pressure    • Injury of neck         No past surgical history on file.    PT Ortho     Row Name 21 0700       Subjective Comments    Subjective Comments  Patient reports the shoulder was feeling better. He reports no pain this morning.  -AJ       Precautions and Contraindications    Precautions  (S) Cancer History no ESTIM or US  -       Subjective Pain    Able to rate subjective pain?  yes  -    Pre-Treatment Pain Level  0  -AJ       Right Upper Ext    Rt Shoulder Abduction AROM  165°  -AJ    Rt Shoulder Flexion AROM  165°  -AJ    Rt Shoulder External Rotation AROM  85° @ 90° of shoulder abduction  -AJ    Rt Shoulder Internal Rotation AROM  55° @ 90° of shoulder abduction  -AJ       MMT (Manual Muscle Testing)    General MMT Comments  B UE 5/5.  -AJ       Sensation    Sensation WNL?  WNL  -AJ    Light Touch  No apparent deficits  -AJ       Gait/Stairs (Locomotion)    Comment (Gait/Stairs)  Normal arm swing with gait.  -      User Key  (r) = Recorded By, (t) = Taken By, (c) = Cosigned By    Initials Name Provider Type    Rita Bar, PT DPT Physical Therapist              Barriers to Rehab: Include significant or possible arthritic/degenerative changes that have occurred within the joint, The chronicity of this issue.    Safety Issues: Cancer history, No  "estim/US          PT Assessment/Plan     Row Name 09/21/21 0800          PT Assessment    Functional Limitations  Performance in sport activities  -AJ     Impairments  Endurance  -AJ     Assessment Comments  patient met all goals and I with final HEP.  -AJ     Rehab Potential  Good  -AJ     Patient/caregiver participated in establishment of treatment plan and goals  Yes  -AJ     Patient would benefit from skilled therapy intervention  No  -AJ        PT Plan    PT Frequency  -- N/A  -AJ     PT Plan Comments  D/C today with final HEP.  -AJ       User Key  (r) = Recorded By, (t) = Taken By, (c) = Cosigned By    Initials Name Provider Type    Rita Bar, PT DPT Physical Therapist              OP Exercises     Row Name 09/21/21 0700             Subjective Comments    Subjective Comments  Patient reports the shoulder was feeling better. He reports no pain this morning.  -AJ         Subjective Pain    Able to rate subjective pain?  yes  -AJ      Pre-Treatment Pain Level  0  -AJ      Post-Treatment Pain Level  0  -         Exercise 1    Exercise Name 1  Pro II UE Fwd/Retro for ROM, Posturing, Strength, Endurance  -      Time 1  10 minutes  -AJ      Additional Comments  L 7.0  -         Exercise 2    Exercise Name 2  IR S with strap  -      Reps 2  3  -AJ      Time 2  1 minute  -         Exercise 3    Exercise Name 3  measurements  -AJ         Exercise 4    Exercise Name 4  Clocks 3-way  -      Reps 4  20 each  -AJ      Additional Comments  GTB  -AJ         Exercise 5    Exercise Name 5  No $  -AJ      Reps 5  20  -AJ      Time 5  5\" hold  -AJ      Additional Comments  GTB  -         Exercise 6    Exercise Name 6  BTB Rws: Lo, Mid  -AJ      Reps 6  20 each  -AJ         Exercise 7    Exercise Name 7  BTB B shoulder ext  -      Reps 7  20  -AJ        User Key  (r) = Recorded By, (t) = Taken By, (c) = Cosigned By    Initials Name Provider Type    Rita Bar, PT DPT Physical Therapist "                         PT OP Goals     Row Name 09/21/21 0700          PT Short Term Goals    STG 1   I with HEP and have additions/changes by next re-certification.  -     STG 1 Progress  Met  -     STG 2  Patient to be more aware of posture and posture correction technique.  -     STG 2 Progress  Partially Met  -     STG 3  AROM R Shoulder flexion >=145°  -     STG 3 Progress  Met  -     STG 4  AROM R Shoulder abduction >=125°.  -     STG 4 Progress  Met  -     STG 5  AROM R shoulder IR >= 45° at 90° of shoulder abduction.  -     STG 5 Progress  Met  -     STG 6  AROM R shoulder ER >= 75° at 90° of shoulder abduction  -     STG 6 Progress  Met  -     STG 7  R Shoulder  MMT >= 4/5, in available range  -     STG 7 Progress  Met  -        Long Term Goals    LTG 1   Patient to have full AAROM with 3-way pulley's  -     LTG 1 Progress  Met  -     LTG 2  AROM R Shoulder flexion/abduction >=160°.  -     LTG 2 Progress  Met  -     LTG 3   AROM R shoulder IR >= 60° at 90° of shoulder abduction.  -     LTG 3 Progress  Met  -     LTG 3 Progress Comments  Met witihn margin of error.  -     LTG 4  AROM R shoulder ER >= 75° at 90° of shoulder abduction.   -     LTG 4 Progress  Met  -     LTG 5   B UE 5/5  -     LTG 5 Progress  Met  -     LTG 6  Patient able to perform 1 arm box carry with 10# for 3 minutes with no increase in pain.  -     LTG 6 Progress  Met  -     LTG 7  Patient able to perform 3 minutes of OH activities with no increase in pain.  -     LTG 7 Progress  Met  -     LTG 8   I with final HEP  -     LTG 8 Progress  Met  -        Time Calculation    PT Goal Re-Cert Due Date  -- N/A  -       User Key  (r) = Recorded By, (t) = Taken By, (c) = Cosigned By    Initials Name Provider Type    Rita Bar, PT DPT Physical Therapist          Therapy Education  Given: HEP, Symptoms/condition management, Pain management, Posture/body  mechanics  Program: Reinforced  How Provided: Verbal  Provided to: Patient  Level of Understanding: Teach back education performed, Verbalized, Demonstrated    Outcome Measure Options: Quick DASH  Quick DASH  Open a tight or new jar.: No Difficulty  Do heavy household chores (e.g., wash walls, wash floors): No Difficulty  Carry a shopping bag or briefcase: No Difficulty  Wash your back: No Difficulty  Use a knife to cut food: No Difficulty  Recreational activities in which you take some force or impact through your arm, should or hand (e.g. golf, hammering, tennis, etc.): No Difficulty  During the past week, to what extent has your arm, shoulder, or hand problem interfered with your normal social activites with family, friends, neighbors or groups?: Moderately  During the past week, were you limited in your work or other regular daily activities as a result of your arm, shoulder or hand problem?: Slightly Limited  Arm, Shoulder, or hand pain: Mild  Tingling (pins and needles) in your arm, shoulder, or hand: Mild  During the past week, how much difficulty have you had sleeping because of the pain in your arm, shoulder or hand?: Moderate Difficiculty  Number of Questions Answered: 11  Quick DASH Score: 15.91         Time Calculation:   Start Time: 0750  Stop Time: 0828  Time Calculation (min): 38 min  Total Timed Code Minutes- PT: 38 minute(s)  Therapy Charges for Today     Code Description Service Date Service Provider Modifiers Qty    53827449132  PT THER SUPP EA 15 MIN 9/21/2021 Rita Shore, PT DPT GP 1    [id number] HC PT THERAPEUTIC ACT EA 15 MIN 9/21/2021 Rita Shore PT DPT GP 1    [id number]  PT THER PROC EA 15 MIN 9/21/2021 Rtia Shore, PT DPT GP 2          PT G-Codes  Outcome Measure Options: Quick DASH  Quick DASH Score: 15.91     OP PT Discharge Summary  Date of Discharge: 09/21/21  Reason for Discharge: All goals achieved, Independent  Outcomes Achieved: Able to achieve  all goals within established timeline  Discharge Destination: Home with home program      This document has been electronically signed by Rita Shore, PT DPT, CSCS on September 21, 2021 08:30 CDT

## 2021-09-24 ENCOUNTER — APPOINTMENT (OUTPATIENT)
Dept: PHYSICAL THERAPY | Facility: HOSPITAL | Age: 61
End: 2021-09-24

## 2021-09-28 ENCOUNTER — APPOINTMENT (OUTPATIENT)
Dept: PHYSICAL THERAPY | Facility: HOSPITAL | Age: 61
End: 2021-09-28

## 2021-09-30 ENCOUNTER — APPOINTMENT (OUTPATIENT)
Dept: PHYSICAL THERAPY | Facility: HOSPITAL | Age: 61
End: 2021-09-30

## 2022-07-05 ENCOUNTER — TRANSCRIBE ORDERS (OUTPATIENT)
Dept: PHYSICAL THERAPY | Facility: HOSPITAL | Age: 62
End: 2022-07-05

## 2022-07-05 DIAGNOSIS — Z47.1 AFTERCARE FOLLOWING JOINT REPLACEMENT SURGERY, UNSPECIFIED JOINT: Primary | ICD-10-CM

## 2022-10-04 ENCOUNTER — TRANSCRIBE ORDERS (OUTPATIENT)
Dept: PHYSICAL THERAPY | Facility: HOSPITAL | Age: 62
End: 2022-10-04

## 2022-10-04 DIAGNOSIS — Z96.652 AFTERCARE FOLLOWING LEFT KNEE JOINT REPLACEMENT SURGERY: Primary | ICD-10-CM

## 2022-10-04 DIAGNOSIS — Z47.1 AFTERCARE FOLLOWING LEFT KNEE JOINT REPLACEMENT SURGERY: Primary | ICD-10-CM
